# Patient Record
Sex: MALE | Race: BLACK OR AFRICAN AMERICAN | Employment: FULL TIME | URBAN - METROPOLITAN AREA
[De-identification: names, ages, dates, MRNs, and addresses within clinical notes are randomized per-mention and may not be internally consistent; named-entity substitution may affect disease eponyms.]

---

## 2017-05-31 ENCOUNTER — OFFICE VISIT (OUTPATIENT)
Dept: ORTHOPEDIC SURGERY | Age: 37
End: 2017-05-31

## 2017-05-31 VITALS
WEIGHT: 181 LBS | HEIGHT: 69 IN | BODY MASS INDEX: 26.81 KG/M2 | DIASTOLIC BLOOD PRESSURE: 85 MMHG | SYSTOLIC BLOOD PRESSURE: 139 MMHG | HEART RATE: 80 BPM | TEMPERATURE: 97.1 F

## 2017-05-31 DIAGNOSIS — S83.412A COMPLETE TEAR OF MCL OF KNEE, LEFT, INITIAL ENCOUNTER: Primary | ICD-10-CM

## 2017-05-31 DIAGNOSIS — S83.512A LEFT ACL TEAR: ICD-10-CM

## 2017-05-31 PROCEDURE — L1832 KO ADJ JNT POS R SUP PRE CST: HCPCS | Performed by: ORTHOPAEDIC SURGERY

## 2017-05-31 PROCEDURE — 99203 OFFICE O/P NEW LOW 30 MIN: CPT | Performed by: ORTHOPAEDIC SURGERY

## 2017-06-01 ENCOUNTER — TELEPHONE (OUTPATIENT)
Dept: ORTHOPEDIC SURGERY | Age: 37
End: 2017-06-01

## 2017-06-05 ENCOUNTER — HOSPITAL ENCOUNTER (OUTPATIENT)
Dept: MRI IMAGING | Age: 37
Discharge: HOME | End: 2017-06-05
Attending: ORTHOPAEDIC SURGERY | Admitting: ORTHOPAEDIC SURGERY

## 2017-06-05 DIAGNOSIS — S83.512A LEFT ACL TEAR: ICD-10-CM

## 2017-06-05 DIAGNOSIS — S83.412A SPRAIN OF MEDIAL COLLATERAL LIGAMENT OF LEFT KNEE: ICD-10-CM

## 2017-06-05 DIAGNOSIS — S83.412A COMPLETE TEAR OF MCL OF KNEE, LEFT, INITIAL ENCOUNTER: ICD-10-CM

## 2017-06-06 ENCOUNTER — OFFICE VISIT (OUTPATIENT)
Dept: ORTHOPEDIC SURGERY | Age: 37
End: 2017-06-06

## 2017-06-06 VITALS
HEART RATE: 77 BPM | SYSTOLIC BLOOD PRESSURE: 128 MMHG | DIASTOLIC BLOOD PRESSURE: 85 MMHG | WEIGHT: 191 LBS | BODY MASS INDEX: 27.35 KG/M2 | HEIGHT: 70 IN

## 2017-06-06 DIAGNOSIS — S83.512A LEFT ACL TEAR: Primary | ICD-10-CM

## 2017-06-06 DIAGNOSIS — S83.412D COMPLETE TEAR OF MCL OF KNEE, LEFT, SUBSEQUENT ENCOUNTER: ICD-10-CM

## 2017-06-06 DIAGNOSIS — S83.242D TEAR OF MEDIAL MENISCUS OF LEFT KNEE, CURRENT, UNSPECIFIED TEAR TYPE, SUBSEQUENT ENCOUNTER: ICD-10-CM

## 2017-06-06 DIAGNOSIS — S83.282D TEAR OF LATERAL MENISCUS OF LEFT KNEE, CURRENT, UNSPECIFIED TEAR TYPE, SUBSEQUENT ENCOUNTER: ICD-10-CM

## 2017-06-06 PROCEDURE — 99213 OFFICE O/P EST LOW 20 MIN: CPT | Performed by: ORTHOPAEDIC SURGERY

## 2017-07-11 ENCOUNTER — OFFICE VISIT (OUTPATIENT)
Dept: ORTHOPEDIC SURGERY | Age: 37
End: 2017-07-11

## 2017-07-11 VITALS
WEIGHT: 191 LBS | HEIGHT: 70 IN | TEMPERATURE: 98.2 F | BODY MASS INDEX: 27.35 KG/M2 | DIASTOLIC BLOOD PRESSURE: 82 MMHG | HEART RATE: 82 BPM | SYSTOLIC BLOOD PRESSURE: 120 MMHG

## 2017-07-11 DIAGNOSIS — S83.282D TEAR OF LATERAL MENISCUS OF LEFT KNEE, CURRENT, UNSPECIFIED TEAR TYPE, SUBSEQUENT ENCOUNTER: ICD-10-CM

## 2017-07-11 DIAGNOSIS — S83.242D TEAR OF MEDIAL MENISCUS OF LEFT KNEE, CURRENT, UNSPECIFIED TEAR TYPE, SUBSEQUENT ENCOUNTER: ICD-10-CM

## 2017-07-11 DIAGNOSIS — S83.412D COMPLETE TEAR OF MCL OF KNEE, LEFT, SUBSEQUENT ENCOUNTER: Primary | ICD-10-CM

## 2017-07-11 DIAGNOSIS — S83.512A LEFT ACL TEAR: ICD-10-CM

## 2017-07-11 PROCEDURE — 99213 OFFICE O/P EST LOW 20 MIN: CPT | Performed by: ORTHOPAEDIC SURGERY

## 2021-08-03 ENCOUNTER — HOSPITAL ENCOUNTER (OUTPATIENT)
Age: 41
Discharge: HOME | End: 2021-08-03
Payer: COMMERCIAL

## 2021-08-03 ENCOUNTER — HOSPITAL ENCOUNTER (OUTPATIENT)
Dept: GENERAL RADIOLOGY | Age: 41
Discharge: HOME | End: 2021-08-03
Payer: COMMERCIAL

## 2021-08-03 DIAGNOSIS — Z02.1 ENCOUNTER FOR PRE-EMPLOYMENT EXAMINATION: ICD-10-CM

## 2021-08-03 PROCEDURE — 71046 X-RAY EXAM CHEST 2 VIEWS: CPT

## 2022-06-12 ENCOUNTER — HOSPITAL ENCOUNTER (EMERGENCY)
Age: 42
Discharge: HOME | End: 2022-06-12
Attending: STUDENT IN AN ORGANIZED HEALTH CARE EDUCATION/TRAINING PROGRAM
Payer: COMMERCIAL

## 2022-06-12 VITALS
BODY MASS INDEX: 24.91 KG/M2 | HEART RATE: 80 BPM | DIASTOLIC BLOOD PRESSURE: 60 MMHG | RESPIRATION RATE: 20 BRPM | HEIGHT: 69 IN | WEIGHT: 168.2 LBS | TEMPERATURE: 98.2 F | OXYGEN SATURATION: 98 % | SYSTOLIC BLOOD PRESSURE: 118 MMHG

## 2022-06-12 DIAGNOSIS — N48.30 PRIAPISM: Primary | ICD-10-CM

## 2022-06-12 PROCEDURE — 99282 EMERGENCY DEPT VISIT SF MDM: CPT

## 2022-06-12 RX ORDER — EMPAGLIFLOZIN 25 MG/1
TABLET, FILM COATED ORAL
COMMUNITY
Start: 2022-05-28

## 2022-06-12 RX ORDER — DULAGLUTIDE 4.5 MG/.5ML
INJECTION, SOLUTION SUBCUTANEOUS
COMMUNITY
Start: 2022-06-07

## 2022-06-12 ASSESSMENT — PAIN DESCRIPTION - LOCATION: LOCATION: PENIS

## 2022-06-12 ASSESSMENT — PAIN - FUNCTIONAL ASSESSMENT: PAIN_FUNCTIONAL_ASSESSMENT: 0-10

## 2022-06-12 ASSESSMENT — PAIN SCALES - GENERAL: PAINLEVEL_OUTOF10: 7

## 2022-06-12 NOTE — ED PROVIDER NOTES
In addition to the advanced practice provider, I personally saw Benito Wilson and performed a substantive portion of the visit including all aspects of the medical decision making. Medical Decision Making  Patient presents evaluation of sustained penile erection. Present since noon. He injected himself with a mixture of papervine, phentolamine and alprostadil that he bought online. He had intercourse with his wife afterward. Erection has been sustained and is not improving. He did inject an uncertain amount of phenylephrine into his penis afterwards without change. Exam patient has sustained erection without ecchymosis or signs of penile fracture      Screenings  Is this patient to be included in the SEP-1 Core Measure due to severe sepsis or septic shock? No   Exclusion criteria - the patient is NOT to be included for SEP-1 Core Measure due to: Infection is not suspected     [place] Coma Scale  Eye Opening: Spontaneous  Best Verbal Response: Oriented  Best Motor Response: Obeys commands  [place] Coma Scale Score: 15         No orders to display     Labs Reviewed - No data to display  Medications - No data to display         Course and MDM:  10-year-old male with sustained priapism after injecting his penis with phentolamine and alprostadil. He arrives hemodynamically stable. Erection has been sustained for over 4 hours. He injected himself with an uncertain amount of phenylephrine. We confirmed with Dr. Kenny Peoples with urology that we can give  this patient more phenylephrine as it is very short acting if we are unable to aspirate a significant amount of blood. Please see ELENO note regarding aspiration of corpus cavernosum after penile block. he was observed in the emergency room for 2 hours status post aspiration. Priapism has improved significantly. Dr. Kenny Peoples evaluated the patient at bedside and recommends follow up in clinic this week.  Pt to return to the ER for any new or worsening symptoms or sustained erection. Patient agreeable not to perform any self injections into his penile shaft at home. The total Critical Care time is 35 minutes which excludes separately billable procedures. Patient Referrals:  Samuel Abbott, [address] [facility] [phone]    Schedule an appointment as soon as possible for a visit   For re-check, As needed    [facility] Emergency Department  [address] [facility]  [phone]    As needed      Discharge Medications:  Discharge Medication List as of 6/12/2022  7:21 PM          FINAL IMPRESSION  1. Priapism        Blood pressure 118/60, pulse 80, temperature 98.2 °F (36.8 °C), temperature source Oral, resp. rate 20, height 5' 9\" (1.753 m), weight 168 lb 3.2 oz (76.3 kg), SpO2 98 %.      For further details of [facility] emergency department encounter, please see documentation by advanced practice provider      Ronnie Millard MD  06/12/22 1864

## 2022-06-12 NOTE — ED PROVIDER NOTES
[facility]        Pt Name: Sumeet Tiwari  MRN: [id number]  [place] 1980  Date of evaluation: 6/12/2022  Provider: Seema Phan PA-C  PCP: [facility] (Inactive)  Note Started: 6:08 PM EDT        I have seen and evaluated this patient with my supervising physician Alcides Parrish MD.    [address] [place] [address]       Chief Complaint   Patient presents with    Other     Penile injection at 1200 today causing erection, penis has been erect since that time. (injection was TriMix- mixture of papaverine, phentolamine, and alprostadil)       HISTORY OF PRESENT ILLNESS   (Location, Timing/Onset, Context/Setting, Quality, Duration, Modifying Factors, Severity, Associated Signs and Symptoms)  Note limiting factors. Chief Complaint: Priapism    Sumeet Tiwari is a 35 y.o. male who presents to the emergency department with a chief complaint of a priapism. He states that he injected Trimix due to erectile dysfunction that he got from [email or web address]. He states this was shipped to him overnight and he had some sort of appointment with them that was due telemedicine. He is unsure of who he met with. He injected this this morning and had intercourse with his wife and still has had erections since then. States that he had phenylephrine that they sent to him. He is unsure of the concentration but injected 0.25 mL into each side of his penis of the phenylephrine at around 2 PM.  Then around 3 PM he took 120 mg of Sudafed and finally presented here shortly after 4 PM with a persistent erection. States he is now starting to have some discomfort that he rates at a 7 out of 10. Denies nausea, vomiting, fevers, testicular pain or any other symptoms. Has history of diabetes. Denies any other history of ongoing medical issues. Nursing Notes were all reviewed and agreed with or any disagreements were addressed in the HPI.     REVIEW OF SYSTEMS    (2-9 systems for level 4, 10 or more for level 5)     Review of Systems    Positives and Pertinent negatives as per HPI. Except as noted above in the ROS, all other systems were reviewed and negative. PAST MEDICAL HISTORY     Past Medical History:   Diagnosis Date    Diabetes mellitus ([address]          SURGICAL HISTORY     Past Surgical History:   Procedure Laterality Date    TONSILLECTOMY           CURRENTMEDICATIONS       Previous Medications    INSULIN DETEMIR (LEVEMIR FLEXPEN SC)    Inject 15 Units into the skin daily. JARDIANCE 25 MG TABLET    TAKE 1 TABLET BY MOUTH DAILY    METFORMIN (GLUCOPHAGE) 500 MG TABLET    Take 500 mg by mouth 2 times daily (with meals). TRULICITY 4.5 XG/6.4PU SOPN    INJECT 0.5 ML SUBCUTANEOUS EVERY 7 DAYS         ALLERGIES     Patient has no known allergies. FAMILYHISTORY     History reviewed. No pertinent family history. SOCIAL HISTORY       Social History     Tobacco Use    Smoking status: Current Every Day Smoker     Packs/day: 0.25     Types: Cigarettes    Smokeless tobacco: Never Used   Substance Use Topics    Alcohol use: No    Drug use: No       SCREENINGS    [place] Coma Scale  Eye Opening: Spontaneous  Best Verbal Response: Oriented  Best Motor Response: Obeys commands  [place] Coma Scale Score: 15        PHYSICAL EXAM    (up to 7 for level 4, 8 or more for level 5)     ED Triage Vitals [06/12/22 1630]   BP Temp Temp Source Heart Rate Resp SpO2 Height Weight   (!) 149/86 98.2 °F (36.8 °C) Oral 95 16 97 % 5' 9\" (1.753 m) 168 lb 3.2 oz (76.3 kg)       Physical Exam  Vitals and nursing note reviewed. Constitutional:       Appearance: He is well-developed. He is not diaphoretic. HENT:      Head: Atraumatic. Nose: Nose normal.   Eyes:      General:         Right eye: No discharge. Left eye: No discharge. Cardiovascular:      Rate and Rhythm: Normal rate and regular rhythm. Heart sounds: No murmur heard.   No friction rub. No gallop. Pulmonary:      Effort: Pulmonary effort is normal. No respiratory distress. Breath sounds: No stridor. No wheezing, rhonchi or rales. Abdominal:      General: Bowel sounds are normal. There is no distension. Palpations: Abdomen is soft. There is no mass. Tenderness: There is no abdominal tenderness. There is no guarding or rebound. Hernia: No hernia is present. Genitourinary:     Comments: Priapism noted without erythema, edema. No testicular tenderness or mass. Musculoskeletal:         General: No swelling. Normal range of motion. Cervical back: Normal range of motion. Skin:     General: Skin is warm and dry. Findings: No erythema or rash. Neurological:      Mental Status: He is alert and oriented to person, place, and time. Cranial Nerves: No cranial nerve deficit. Psychiatric:         Behavior: Behavior normal.         DIAGNOSTIC RESULTS   LABS:    Labs Reviewed - No data to display    When ordered only abnormal lab results are displayed. All other labs were within normal range or not returned as of this dictation. EKG: When ordered, EKG's are interpreted by the Emergency Department Physician in the absence of a cardiologist.  Please see their note for interpretation of EKG. RADIOLOGY:   Non-plain film images such as CT, Ultrasound and MRI are read by the radiologist. Plain radiographic images are visualized and preliminarily interpreted by the ED Provider with the below findings:        Interpretation per the Radiologist below, if available at the time of this note:    No orders to display     No results found. PROCEDURES   Unless otherwise noted below, none     Procedures   Verbal consent was obtained from patient to place some local anesthesia with 0.25% Marcaine without epinephrine and aspirated blood from corpus cavernosum due to priapism. He understood the risk of bleeding, pain and infection.   A 25-gauge needle was used to inject 1 cc of local anesthetic at the 2 and 10 o'clock position at the base of the penis. About 10 minutes after this a 18-gauge needle was used and a total of 55 cc of blood was drained total from both sides of the corpus cavernosum. Priapism is slowly improved. There was a small amount of bleeding but it stopped. Patient handled procedure well. CRITICAL CARE TIME       CONSULTS: Dr. Jaime Novak and DIFFERENTIAL DIAGNOSIS/MDM:   Vitals:    Vitals:    06/12/22 1630 06/12/22 1757   BP: (!) 149/86 118/60   Pulse: 95 80   Resp: 16 20   Temp: 98.2 °F (36.8 °C)    TempSrc: Oral    SpO2: 97% 98%   Weight: 168 lb 3.2 oz (76.3 kg)    Height: 5' 9\" (1.753 m)        Patient was given the following medications:  Medications - No data to display      Is this patient to be included in the SEP-1 Core Measure due to severe sepsis or septic shock? No   Exclusion criteria - the patient is NOT to be included for SEP-1 Core Measure due to: Infection is not suspected    Patient presented with priapism after injecting Trimix that he received in the mail. Patient was observed here for 2 hours after 55 cc of blood was drained from the corpus cavernosum. Priapism is slowly improved. Penis is now soft and mobile and able to bend. Patient will take 30 mg of Sudafed that he already has at home every 6 hours for the next few days. He will follow-up with urology as needed as an outpatient. Return here for any worsening of symptoms or problems at home. FINAL IMPRESSION      1. Priapism          DISPOSITION/PLAN   DISPOSITION        PATIENT REFERRED TO:  No follow-up provider specified.     DISCHARGE MEDICATIONS:  New Prescriptions    No medications on file       DISCONTINUED MEDICATIONS:  Discontinued Medications    No medications on file              (Please note that portions of this note were completed with a voice recognition program.  Efforts were made to edit the dictations but occasionally words are mis-transcribed.)    Lawrence Vides PA-C (electronically signed)            Lawrence Vides PA-C  06/12/22 1918

## 2022-06-12 NOTE — CONSULTS
[facility] Consult Note  [facility]    Provider: Gelacio Weiner MD MD Patient ID:  Admission Date: 2022 Name: Tavares Estrada Date: n/a MRN: [id number]   Patient Location: ED- : 1980  Attending: Tomi Kocher, MD Date of Service: 2022  PCP: [facility] (Inactive)     Diagnoses:  1. Priapism      Assessment/Plan:  Priapism seems reduced at this point. We will monitor for a little bit longer, and then discharged home.  - Recommend in [facility] urology care rather than mail order Trimix injections. Dr. Louie Aguilar.  - [place] [facility] for the next 24 hours    All the patients questions were answered in detail. He understands the plan as listed above. CC:   Chief Complaint   Patient presents with    Other     Penile injection at 1200 today causing erection, penis has been erect since that time. (injection was TriMix- mixture of papaverine, phentolamine, and alprostadil)       HPI: Faviola Ruiz is a 35 y.o. male. I saw the patient today for priapism, and associated symptoms of penile pain, that have been present for about 5 hours. Symptoms relieved by injection of local anesthesia to the base of the penis and aspiration of corporal stagnant blood and aggravated by injection of mail order Trimix. He has tried the following treatments: He did self inject an unknown quantity of phenylephrine while at home. He was then treated with Sudafed approximately 4 hours ago. He was then treated with corporal irrigation in the emergency department with resolution of pain. Past medical History:   He has a past medical history of Diabetes mellitus ([address] Past Surgical History:  He has a past surgical history that includes Tonsillectomy. Allergies:   No Known Allergies    Social History:  He reports that he has been smoking cigarettes.  He has been smoking about 0.25 packs per day. He has never used smokeless tobacco. He reports that he does not drink alcohol and does not use drugs. Family History:  family history is not on file. Medications:   Scheduled Meds:  Continuous Infusions:  PRN Meds:    Review of Systems:  Constitutional: Negative for fever    Genitourinary: see HPI  Eyes: negative for sudden change in vision  EENT: no complaints  Cardiovascular: Negative for chest pain  Respiratory: Negative for shortness of breath  Gastrointestinal: Negative for nausea  Musculoskeletal: Negative for back pain   Neurological: Negative for weakness  Psychiatric: Negative for anxiety  Integumentary: Negative for rashes or adenopathy     Physical Exam:  Vitals:    06/12/22 1757   BP: 118/60   Pulse: 80   Resp: 20   Temp:    SpO2: 98%     Constitutional: NAD, well-developed, well-nourished. HEENT: MMM. Hearing intact. PERRL  Neck: no thyroid masses appreciated. Trachea is midline. Neck appears unremarkable   Lymph: no palpable adenopathy in supraclavicular, or axillary lymph nodes  Cardiovascular: Regular rate. No peripheral edema  Respiratory: Respirations are even and non-labored. No audible breath sounds. Genitourinary: circumcised penis, glans normal, no penile discharge. Penis is malleable but still with a partial erection. No rashes/lesions. Testes descended bilaterally, no masses, nontender to palpation. Remainder of scrotal contents normal. No hernia appreciated. Abdomen: Soft. No distension, tenderness, hernias, masses or guarding. No CVA tenderness. No hernias appreciated. Liver and spleen appear normal  Psychiatric: A + O x 3, normal affect. Insight appears intact. Muskuloskeletal: YANET x 4   Skin: Pink, warm and dry. No rashes on face and arms.     Labs:  No results found for: WBC, HGB, HCT, MCV, PLT  No results found for: CREATININE, BUN, NA, K, CL, CO2  No results found for: PSA     Imaging:   Not performed    Clearance MD Lobito  6/12/2022